# Patient Record
Sex: FEMALE | Race: BLACK OR AFRICAN AMERICAN
[De-identification: names, ages, dates, MRNs, and addresses within clinical notes are randomized per-mention and may not be internally consistent; named-entity substitution may affect disease eponyms.]

---

## 2019-11-24 ENCOUNTER — HOSPITAL ENCOUNTER (INPATIENT)
Dept: HOSPITAL 82 - ED | Age: 64
LOS: 2 days | Discharge: HOME | DRG: 343 | End: 2019-11-26
Attending: SURGERY | Admitting: SURGERY
Payer: SELF-PAY

## 2019-11-24 VITALS — HEIGHT: 62 IN | WEIGHT: 212.5 LBS | BODY MASS INDEX: 39.11 KG/M2

## 2019-11-24 VITALS — SYSTOLIC BLOOD PRESSURE: 147 MMHG | DIASTOLIC BLOOD PRESSURE: 72 MMHG

## 2019-11-24 DIAGNOSIS — F17.200: ICD-10-CM

## 2019-11-24 DIAGNOSIS — E78.5: ICD-10-CM

## 2019-11-24 DIAGNOSIS — K35.80: Primary | ICD-10-CM

## 2019-11-24 LAB
ALBUMIN SERPL-MCNC: 4.9 G/DL (ref 3.2–5)
ALP SERPL-CCNC: 80 U/L (ref 38–126)
AMYLASE SERPL-CCNC: 72 U/L (ref 30–110)
ANION GAP SERPL CALCULATED.3IONS-SCNC: 19 MMOL/L
AST SERPL-CCNC: 23 U/L (ref 9–36)
BASOPHILS NFR BLD AUTO: 0 % (ref 0–3)
BILIRUB UR QL STRIP.AUTO: NEGATIVE
BUN SERPL-MCNC: 10 MG/DL (ref 8–23)
BUN/CREAT SERPL: 13
CHLORIDE SERPL-SCNC: 101 MMOL/L (ref 95–108)
CO2 SERPL-SCNC: 21 MMOL/L (ref 22–30)
COLOR UR AUTO: YELLOW
CREAT SERPL-MCNC: 0.8 MG/DL (ref 0.5–1)
EOSINOPHIL NFR BLD AUTO: 0 % (ref 0–8)
ERYTHROCYTE [DISTWIDTH] IN BLOOD BY AUTOMATED COUNT: 14.8 % (ref 11.5–15.5)
GLUCOSE UR STRIP.AUTO-MCNC: NEGATIVE MG/DL
HCT VFR BLD AUTO: 46.8 % (ref 37–47)
HGB BLD-MCNC: 15 G/DL (ref 12–16)
HGB UR QL STRIP.AUTO: (no result)
IMM GRANULOCYTES NFR BLD: 0.4 % (ref 0–5)
KETONES UR STRIP.AUTO-MCNC: NEGATIVE MG/DL
LEUKOCYTE ESTERASE UR QL STRIP.AUTO: NEGATIVE
LYMPHOCYTES NFR BLD: 10 % (ref 15–41)
MCH RBC QN AUTO: 29.9 PG  CALC (ref 26–32)
MCHC RBC AUTO-ENTMCNC: 32.1 G/L CALC (ref 32–36)
MCV RBC AUTO: 93.2 FL  CALC (ref 80–100)
MONOCYTES NFR BLD AUTO: 3 % (ref 2–13)
NEUTROPHILS # BLD AUTO: 12.18 THOU/UL (ref 2–7.15)
NEUTROPHILS NFR BLD AUTO: 87 % (ref 42–76)
NITRITE UR QL STRIP.AUTO: NEGATIVE
PH UR STRIP.AUTO: 6 [PH] (ref 4.5–8)
PLATELET # BLD AUTO: 294 THOU/UL (ref 130–400)
POTASSIUM SERPL-SCNC: 4.5 MMOL/L (ref 3.5–5.1)
PROT SERPL-MCNC: 8.8 G/DL (ref 6.3–8.2)
PROT UR QL STRIP.AUTO: NEGATIVE MG/DL
RBC # BLD AUTO: 5.02 MILL/UL (ref 4.2–5.6)
RBC #/AREA URNS HPF: (no result) RBC/HPF (ref 0–5)
SODIUM SERPL-SCNC: 137 MMOL/L (ref 137–146)
SP GR UR STRIP.AUTO: <=1.005
SQUAMOUS URNS QL MICRO: (no result) EPI/HPF
UROBILINOGEN UR QL STRIP.AUTO: 0.2 E.U./DL
WBC #/AREA URNS HPF: (no result) WBC/HPF (ref 0–5)

## 2019-11-24 NOTE — NUR
PT IMMEDIATELY TO ER RM 13.A/P F WITH DIFF EMPTYING HER BLADDER AND NO BM X2-3
DAYS.WITH N/V ONSET S/S AT 2AM TODAY.TOOK OTC LAXATIVE AND PRUNE JUICE TODAY
SKIPPER HER BP MED

## 2019-11-24 NOTE — NUR
PATIENT ADMITTED FROM ER VIA STRETCHER WITH ER STAFF IN ATTENDNCE.  PATIENT
ABLE TO STAND AND TRANSFER TO BR TO VOID. THEN MIN ASSIST TO THE BED.  PATIENT
DROWSY BUT RESPONSIVE-ADMITTED FOR ACUTE APPY. NPO AT THIS TIME.  PATIENT WITH
IV SITE TO RIGHT AC-IVF NS HUNG AND INFUSING AS ORDERED.  GOOD BLOOD RETURN
FROM SITE. ZOSYN HUNG AS ORDERED. SURGERY CONSULT WITH DR. GORDON IN AM.
PATIENT ORIENTED TO ROOM AND SURROUNDINGS.  INSTRUCTED PATIENT ON USE OF NURSE
CALL LIGHT SYSTEM, TV REMOTE AND PHONE.  SAFETY PRECAUTIONS REINFORCED. CALL
LIGHT IN REACH. WILL CONT TO MONITOR.

## 2019-11-25 VITALS — DIASTOLIC BLOOD PRESSURE: 55 MMHG | SYSTOLIC BLOOD PRESSURE: 126 MMHG

## 2019-11-25 VITALS — SYSTOLIC BLOOD PRESSURE: 118 MMHG | DIASTOLIC BLOOD PRESSURE: 59 MMHG

## 2019-11-25 VITALS — DIASTOLIC BLOOD PRESSURE: 50 MMHG | SYSTOLIC BLOOD PRESSURE: 116 MMHG

## 2019-11-25 VITALS — SYSTOLIC BLOOD PRESSURE: 139 MMHG | DIASTOLIC BLOOD PRESSURE: 66 MMHG

## 2019-11-25 VITALS — SYSTOLIC BLOOD PRESSURE: 120 MMHG | DIASTOLIC BLOOD PRESSURE: 59 MMHG

## 2019-11-25 VITALS — DIASTOLIC BLOOD PRESSURE: 56 MMHG | SYSTOLIC BLOOD PRESSURE: 131 MMHG

## 2019-11-25 VITALS — DIASTOLIC BLOOD PRESSURE: 67 MMHG | SYSTOLIC BLOOD PRESSURE: 132 MMHG

## 2019-11-25 VITALS — DIASTOLIC BLOOD PRESSURE: 79 MMHG | SYSTOLIC BLOOD PRESSURE: 143 MMHG

## 2019-11-25 VITALS — DIASTOLIC BLOOD PRESSURE: 70 MMHG | SYSTOLIC BLOOD PRESSURE: 141 MMHG

## 2019-11-25 VITALS — SYSTOLIC BLOOD PRESSURE: 123 MMHG | DIASTOLIC BLOOD PRESSURE: 53 MMHG

## 2019-11-25 PROCEDURE — 0DTJ4ZZ RESECTION OF APPENDIX, PERCUTANEOUS ENDOSCOPIC APPROACH: ICD-10-PCS | Performed by: SURGERY

## 2019-11-25 NOTE — NUR
SHIFT CHANGE REPORT, PT AWAKE ALERT AND ORIENTED, C/O RLQ PAIN AT THIS TIME,
OR STAFF HERE RECEIVING PT FOR SURGERY, PT AWARE OF PROCEDURE, LEAVING UNIT AT
THIS TIME VIA STRETCHER TO OR.

## 2019-11-25 NOTE — NUR
PATIENT RESTING IN BED-C/O SEVERE RLQ PAIN. TEARFUL-SPOKE WITH DR. BROOKS AND
NEW ORDER RECIEVED. MORPHINE 4MG IVP GIVEN AS ORDERED FOR SEVERE PAIN.
REMOIANS NPO AT THIS TIME.  IVF PATENT AND INFUSING VIA RAC SITE AT 125CC/HR.
SITE REMAINS HEALTHY. CALL LIGHT IN REACH. WILL CONT TO MONITOR.

## 2019-11-25 NOTE — NUR
PATIENT APPEARS SLEEPING AT THIS TIME.  EYES CLOSED-RESP ARE EVEN AND
UNLABORED.  IVF PATENT AND INFUSING VIA RIGHT AC SITE AS ORDERED.  SITE
REMAINS HEALTHY AT THIS TIME.  CALL LIGHT IN REACH.  WILL CONT TO MONITOR.

## 2019-11-25 NOTE — NUR
PT RETURNED TO UNIT @ 0918 TRANSPORTED BY PACU STAFF VIA STRETCHER AND
ASSISTED TO BED, MOANS AND GROANS IN PAIN DURING TRANSFER, PUNCTURES X 3 TO
LOWER ABD WITH JAYY DRESSINGS CDI, ABD DISTENDED AND TENDER BUT SOFT,
IVF INFUSING, SETTLED IN BED AND SET UP FOR VITAL SIGNS MONITORING, SCD IN
PLACE, FAMILY AT BEDSIDE, WILL CONTINUE TO MONITOR.

## 2019-11-25 NOTE — NUR
PATIENT RESTING IN BED AT THIS TIME-AWAKE ALERT AND IN POST-OP PAIN. MEDICATED
WITH MORPHINE 4MG IVP AND ZOFRAN 4MG IVP VIA LEFT WRIST SITE. IVF NS PATENT
ANDINFUSING AT 125CC/HR.  SITE IS HEALTHY. PATIENT ASSIST TO BR TO VOID AND
THEN BACK TO BED,  ABD DRESSING CDI. TOLERATING PO FLUIDS WELL. WNCOURAGED USE
OF IS AND SCD'S IN PLACE. CALL LIGHT IN REACH. WILL CONT TO MONITOR

## 2019-11-26 VITALS — DIASTOLIC BLOOD PRESSURE: 97 MMHG | SYSTOLIC BLOOD PRESSURE: 169 MMHG

## 2019-11-26 VITALS — SYSTOLIC BLOOD PRESSURE: 169 MMHG | DIASTOLIC BLOOD PRESSURE: 97 MMHG

## 2019-11-26 VITALS — DIASTOLIC BLOOD PRESSURE: 85 MMHG | SYSTOLIC BLOOD PRESSURE: 162 MMHG

## 2019-11-26 VITALS — SYSTOLIC BLOOD PRESSURE: 149 MMHG | DIASTOLIC BLOOD PRESSURE: 83 MMHG

## 2019-11-26 NOTE — NUR
PATIENT ASSISTED TO BR-MIN ASSIST.  VOIDING QS AND HAD MODERATE AMT OF WATERY
GREEN STOOL.  ASSISTED BACK TO BED AND MEDICATED WITH MORPHINE FOR PAIN AND
[id number] FOR NAUSEA.  ZOSYN INFUSING AS ORDERED.  CALL IGHT IN REACH. WILL CONT
TO MONITOR.